# Patient Record
Sex: FEMALE | Race: WHITE
[De-identification: names, ages, dates, MRNs, and addresses within clinical notes are randomized per-mention and may not be internally consistent; named-entity substitution may affect disease eponyms.]

---

## 2020-04-03 ENCOUNTER — HOSPITAL ENCOUNTER (INPATIENT)
Dept: HOSPITAL 15 - ER | Age: 75
LOS: 1 days | Discharge: HOME | DRG: 204 | End: 2020-04-04
Attending: NURSE PRACTITIONER | Admitting: NURSE PRACTITIONER
Payer: COMMERCIAL

## 2020-04-03 VITALS — SYSTOLIC BLOOD PRESSURE: 159 MMHG | DIASTOLIC BLOOD PRESSURE: 100 MMHG

## 2020-04-03 VITALS — BODY MASS INDEX: 28.98 KG/M2 | HEIGHT: 65 IN | WEIGHT: 173.94 LBS

## 2020-04-03 DIAGNOSIS — E66.9: ICD-10-CM

## 2020-04-03 DIAGNOSIS — Z79.899: ICD-10-CM

## 2020-04-03 DIAGNOSIS — R11.2: ICD-10-CM

## 2020-04-03 DIAGNOSIS — Z03.818: ICD-10-CM

## 2020-04-03 DIAGNOSIS — R05: Primary | ICD-10-CM

## 2020-04-03 DIAGNOSIS — I10: ICD-10-CM

## 2020-04-03 DIAGNOSIS — Z88.1: ICD-10-CM

## 2020-04-03 DIAGNOSIS — R53.1: ICD-10-CM

## 2020-04-03 DIAGNOSIS — F31.9: ICD-10-CM

## 2020-04-03 DIAGNOSIS — E87.6: ICD-10-CM

## 2020-04-03 LAB
ALBUMIN SERPL-MCNC: 4.3 G/DL (ref 3.4–5)
ALP SERPL-CCNC: 95 U/L (ref 45–117)
ALT SERPL-CCNC: 15 U/L (ref 13–56)
ANION GAP SERPL CALCULATED.3IONS-SCNC: 10 MMOL/L (ref 5–15)
BILIRUB SERPL-MCNC: 0.4 MG/DL (ref 0.2–1)
BUN SERPL-MCNC: 11 MG/DL (ref 7–18)
BUN/CREAT SERPL: 11.2
CALCIUM SERPL-MCNC: 9.5 MG/DL (ref 8.5–10.1)
CHLORIDE SERPL-SCNC: 104 MMOL/L (ref 98–107)
CO2 SERPL-SCNC: 24 MMOL/L (ref 21–32)
GLUCOSE SERPL-MCNC: 144 MG/DL (ref 74–106)
HCT VFR BLD AUTO: 36.3 % (ref 36–46)
HGB BLD-MCNC: 12.3 G/DL (ref 12.2–16.2)
MAGNESIUM SERPL-MCNC: 2 MG/DL (ref 1.6–2.6)
MCH RBC QN AUTO: 29.6 PG (ref 28–32)
MCV RBC AUTO: 87.3 FL (ref 80–100)
NRBC BLD QL AUTO: 0.1 %
POTASSIUM SERPL-SCNC: 3 MMOL/L (ref 3.5–5.1)
PROT SERPL-MCNC: 8.4 G/DL (ref 6.4–8.2)
SODIUM SERPL-SCNC: 138 MMOL/L (ref 136–145)

## 2020-04-03 PROCEDURE — 81001 URINALYSIS AUTO W/SCOPE: CPT

## 2020-04-03 PROCEDURE — 93005 ELECTROCARDIOGRAM TRACING: CPT

## 2020-04-03 PROCEDURE — 83605 ASSAY OF LACTIC ACID: CPT

## 2020-04-03 PROCEDURE — 87070 CULTURE OTHR SPECIMN AEROBIC: CPT

## 2020-04-03 PROCEDURE — 84443 ASSAY THYROID STIM HORMONE: CPT

## 2020-04-03 PROCEDURE — 83735 ASSAY OF MAGNESIUM: CPT

## 2020-04-03 PROCEDURE — 82728 ASSAY OF FERRITIN: CPT

## 2020-04-03 PROCEDURE — 85379 FIBRIN DEGRADATION QUANT: CPT

## 2020-04-03 PROCEDURE — 80053 COMPREHEN METABOLIC PANEL: CPT

## 2020-04-03 PROCEDURE — 71045 X-RAY EXAM CHEST 1 VIEW: CPT

## 2020-04-03 PROCEDURE — 36415 COLL VENOUS BLD VENIPUNCTURE: CPT

## 2020-04-03 PROCEDURE — 96374 THER/PROPH/DIAG INJ IV PUSH: CPT

## 2020-04-03 PROCEDURE — 83615 LACTATE (LD) (LDH) ENZYME: CPT

## 2020-04-03 PROCEDURE — 87804 INFLUENZA ASSAY W/OPTIC: CPT

## 2020-04-03 PROCEDURE — 74021 RADEX ABDOMEN 3+ VIEWS: CPT

## 2020-04-03 PROCEDURE — 86141 C-REACTIVE PROTEIN HS: CPT

## 2020-04-03 PROCEDURE — 83880 ASSAY OF NATRIURETIC PEPTIDE: CPT

## 2020-04-03 PROCEDURE — 87880 STREP A ASSAY W/OPTIC: CPT

## 2020-04-03 PROCEDURE — 87040 BLOOD CULTURE FOR BACTERIA: CPT

## 2020-04-03 PROCEDURE — 85025 COMPLETE CBC W/AUTO DIFF WBC: CPT

## 2020-04-03 NOTE — NUR
Telemetry admit from ER/placed on Isolation precautions 

BLANCJOSELIN RAMIREZ admitted to Telemetry unit after SBAR received.  Patient oriented to Cecelia Long primary RN, unit, room, bed, and unit policies regarding patient care and visiting 
hours, patient AxOx4. Patient now on continuous telemetry monitoring, tele box # 14 and 
telemetry reading on arrival to unit is SR 94. Patient placed on bedside oxygen @2L, not 
currently not c/o SOB or cough, weighed by bedscale and encouraged to call if they need 
something. All questions and concerns addressed, patient verbalized understanding.

## 2020-04-03 NOTE — NUR
Hospitalist Paged

No diet order noted and patient's blood pressure 160/94 with 68 heart rate. On call 
hospitalist paged, awaiting call back at this time.

## 2020-04-03 NOTE — NUR
Opening Shift Note

Assumed care of patient, awake, alert and oriented x4, even and unlabored respirations on 2L 
via NC, no S/S of distress/SOB or pain.  Patient able to turn independently, bed in lowest 
locked position, side rails up x2, and call light within reach. Instructed on POC and to 
call for assist PRN, will continue to monitor for changes Q1hr and PRN.

## 2020-04-03 NOTE — NUR
Incentive Spirometer at bedside. Patient educated on use, patient verbalized understanding. 
Will continue to enforce education throughout shift and continue to monitor.

## 2020-04-03 NOTE — NUR
Return page from Dr. Efrain Rahman MD, new order for regular diet if patient can tolerate 
eating. MD made aware of patient's jeremy medications, new order to resume patient's home 
medications as taken at home if patient is able to tolerate taking medications. All orders 
read back and verified, will implement and continue to monitor.

## 2020-04-04 VITALS — SYSTOLIC BLOOD PRESSURE: 115 MMHG | DIASTOLIC BLOOD PRESSURE: 64 MMHG

## 2020-04-04 VITALS — SYSTOLIC BLOOD PRESSURE: 143 MMHG | DIASTOLIC BLOOD PRESSURE: 81 MMHG

## 2020-04-04 VITALS — DIASTOLIC BLOOD PRESSURE: 103 MMHG | SYSTOLIC BLOOD PRESSURE: 171 MMHG

## 2020-04-04 VITALS — DIASTOLIC BLOOD PRESSURE: 94 MMHG | SYSTOLIC BLOOD PRESSURE: 149 MMHG

## 2020-04-04 LAB
ALBUMIN SERPL-MCNC: 3.5 G/DL (ref 3.4–5)
ALP SERPL-CCNC: 82 U/L (ref 45–117)
ALT SERPL-CCNC: 16 U/L (ref 13–56)
ANION GAP SERPL CALCULATED.3IONS-SCNC: 11 MMOL/L (ref 5–15)
BILIRUB SERPL-MCNC: 0.4 MG/DL (ref 0.2–1)
BUN SERPL-MCNC: 12 MG/DL (ref 7–18)
BUN/CREAT SERPL: 10.9
CALCIUM SERPL-MCNC: 8.9 MG/DL (ref 8.5–10.1)
CHLORIDE SERPL-SCNC: 105 MMOL/L (ref 98–107)
CO2 SERPL-SCNC: 22 MMOL/L (ref 21–32)
GLUCOSE SERPL-MCNC: 140 MG/DL (ref 74–106)
HCT VFR BLD AUTO: 35.2 % (ref 36–46)
HGB BLD-MCNC: 11.7 G/DL (ref 12.2–16.2)
MAGNESIUM SERPL-MCNC: 2 MG/DL (ref 1.6–2.6)
MCH RBC QN AUTO: 29.2 PG (ref 28–32)
MCV RBC AUTO: 88.1 FL (ref 80–100)
NRBC BLD QL AUTO: 0.1 %
POTASSIUM SERPL-SCNC: 3.1 MMOL/L (ref 3.5–5.1)
PROT SERPL-MCNC: 7.4 G/DL (ref 6.4–8.2)
SODIUM SERPL-SCNC: 138 MMOL/L (ref 136–145)

## 2020-04-04 RX ADMIN — GABAPENTIN SCH MG: 100 CAPSULE ORAL at 05:55

## 2020-04-04 RX ADMIN — GABAPENTIN SCH MG: 100 CAPSULE ORAL at 13:20

## 2020-04-04 NOTE — NUR
Closing Note

Patient sitting in bed, awake and alert. No s/s of distress. Hydralazine administered as 
ordered (see emar), dayshift RN made aware unable to reassess at this time. Will endorse 
care to dayshift RN.

## 2020-04-04 NOTE — NUR
Pt's O2 at 95% at room air, called pt's family to inform of dischargee, spoke to pt's sister 
Cady, as per pt's sister pt's daughter Talia is at work and comes out of work after 2000, 
offered a taxi voucher, as per pt's sister pt can not be sent on a taxi, and that we have to 
wait for pt to be  by Talia after 2000.

## 2020-04-04 NOTE — NUR
Discharge instructions given as ordered. Encourage to follow up with PMD as instructed. All 
questions and concerns addressed. Patient verbalized understanding.  Medication 
reconciliation form completed and copy given to patient. No home medications held in 
Pharmacy, and no needed vaccines to be given. IV removed with catheter intact, pressure 
dressing applied.  Telemetry unit returned to ICU.

## 2020-04-04 NOTE — NUR
Received pt sitting on side of the bed, call light with in reach, pt educted to call for 
assistance, pt denies any pain at this time, pt reports feeling weak, will continue to 
monitor pt.

## 2020-04-04 NOTE — NUR
Pt's daughter here to  pt, discussed plan of care with pt's daughter, answer 
questions and concerns, Patient taken to vehicle via wheelchair with all personal 
belongings, accompanied by staff and family member. No distress noted at time of departure.

## 2020-04-04 NOTE — NUR
Dr. Khan at bed side to see pt, doctor discussed the plan of care with the pt, doctor 
informed that pt's potassium is 3.1, orders received to give potassium 60 meq po x 1, pt 
taken off the oxgen and as per doctor will monitor pt for two hours and if pt's O2 maintains 
above 90% ok to d/c pt.

## 2021-08-04 ENCOUNTER — HOSPITAL ENCOUNTER (EMERGENCY)
Dept: HOSPITAL 15 - ER | Age: 76
Discharge: LEFT BEFORE DISCHARGE | End: 2021-08-04
Payer: COMMERCIAL

## 2021-08-04 VITALS — HEIGHT: 65 IN | BODY MASS INDEX: 29.16 KG/M2 | WEIGHT: 175 LBS

## 2021-08-04 VITALS — DIASTOLIC BLOOD PRESSURE: 83 MMHG | SYSTOLIC BLOOD PRESSURE: 131 MMHG

## 2021-08-04 DIAGNOSIS — Z53.21: ICD-10-CM

## 2021-08-04 DIAGNOSIS — R11.0: ICD-10-CM

## 2021-08-04 DIAGNOSIS — R42: Primary | ICD-10-CM

## 2021-08-04 PROCEDURE — 93005 ELECTROCARDIOGRAM TRACING: CPT
